# Patient Record
Sex: MALE | Race: OTHER | HISPANIC OR LATINO
[De-identification: names, ages, dates, MRNs, and addresses within clinical notes are randomized per-mention and may not be internally consistent; named-entity substitution may affect disease eponyms.]

---

## 2019-03-11 PROBLEM — Z00.00 ENCOUNTER FOR PREVENTIVE HEALTH EXAMINATION: Status: ACTIVE | Noted: 2019-03-11

## 2019-03-14 ENCOUNTER — APPOINTMENT (OUTPATIENT)
Dept: PULMONOLOGY | Facility: CLINIC | Age: 64
End: 2019-03-14

## 2019-03-15 ENCOUNTER — APPOINTMENT (OUTPATIENT)
Dept: PULMONOLOGY | Facility: CLINIC | Age: 64
End: 2019-03-15
Payer: MEDICARE

## 2019-03-15 VITALS
DIASTOLIC BLOOD PRESSURE: 74 MMHG | WEIGHT: 182 LBS | HEART RATE: 94 BPM | HEIGHT: 71 IN | SYSTOLIC BLOOD PRESSURE: 124 MMHG | OXYGEN SATURATION: 97 % | TEMPERATURE: 98.6 F | BODY MASS INDEX: 25.48 KG/M2

## 2019-03-15 DIAGNOSIS — I10 ESSENTIAL (PRIMARY) HYPERTENSION: ICD-10-CM

## 2019-03-15 DIAGNOSIS — Z01.818 ENCOUNTER FOR OTHER PREPROCEDURAL EXAMINATION: ICD-10-CM

## 2019-03-15 DIAGNOSIS — E11.9 TYPE 2 DIABETES MELLITUS W/OUT COMPLICATIONS: ICD-10-CM

## 2019-03-15 DIAGNOSIS — E78.5 HYPERLIPIDEMIA, UNSPECIFIED: ICD-10-CM

## 2019-03-15 DIAGNOSIS — M54.5 LOW BACK PAIN: ICD-10-CM

## 2019-03-15 DIAGNOSIS — Z78.9 OTHER SPECIFIED HEALTH STATUS: ICD-10-CM

## 2019-03-15 DIAGNOSIS — Z87.891 PERSONAL HISTORY OF NICOTINE DEPENDENCE: ICD-10-CM

## 2019-03-15 PROCEDURE — ZZZZZ: CPT

## 2019-03-15 PROCEDURE — 94726 PLETHYSMOGRAPHY LUNG VOLUMES: CPT

## 2019-03-15 PROCEDURE — 94618 PULMONARY STRESS TESTING: CPT

## 2019-03-15 PROCEDURE — 99204 OFFICE O/P NEW MOD 45 MIN: CPT | Mod: 25

## 2019-03-15 PROCEDURE — 94060 EVALUATION OF WHEEZING: CPT | Mod: 59

## 2019-03-15 PROCEDURE — 99407 BEHAV CHNG SMOKING > 10 MIN: CPT

## 2019-03-15 PROCEDURE — 94729 DIFFUSING CAPACITY: CPT

## 2019-03-15 RX ORDER — INSULIN GLARGINE 100 [IU]/ML
100 INJECTION, SOLUTION SUBCUTANEOUS
Refills: 0 | Status: ACTIVE | COMMUNITY

## 2019-03-15 RX ORDER — MIDODRINE HYDROCHLORIDE 10 MG/1
TABLET ORAL
Refills: 0 | Status: ACTIVE | COMMUNITY

## 2019-03-15 RX ORDER — TAMSULOSIN HYDROCHLORIDE 0.4 MG/1
0.4 CAPSULE ORAL
Refills: 0 | Status: ACTIVE | COMMUNITY

## 2019-03-15 RX ORDER — FLUDROCORTISONE ACETATE 0.1 MG/1
0.1 TABLET ORAL
Refills: 0 | Status: ACTIVE | COMMUNITY

## 2019-03-15 RX ORDER — LISINOPRIL 30 MG/1
TABLET ORAL
Refills: 0 | Status: ACTIVE | COMMUNITY

## 2019-03-15 RX ORDER — BUMETANIDE 2 MG/1
TABLET ORAL
Refills: 0 | Status: ACTIVE | COMMUNITY

## 2019-03-15 RX ORDER — NORTRIPTYLINE HYDROCHLORIDE 10 MG/1
10 CAPSULE ORAL
Refills: 0 | Status: ACTIVE | COMMUNITY

## 2019-03-15 RX ORDER — VARENICLINE TARTRATE 1 MG/1
TABLET, FILM COATED ORAL
Refills: 0 | Status: ACTIVE | COMMUNITY

## 2019-03-15 RX ORDER — DILTIAZEM HYDROCHLORIDE 300 MG/1
300 CAPSULE, EXTENDED RELEASE ORAL
Refills: 0 | Status: ACTIVE | COMMUNITY

## 2019-03-15 RX ORDER — DULOXETINE HCL 100 %
POWDER (GRAM) MISCELLANEOUS
Refills: 0 | Status: ACTIVE | COMMUNITY

## 2019-03-15 RX ORDER — ATORVASTATIN CALCIUM 80 MG/1
TABLET, FILM COATED ORAL
Refills: 0 | Status: ACTIVE | COMMUNITY

## 2019-03-15 RX ORDER — MELOXICAM 15 MG/1
15 TABLET ORAL
Refills: 0 | Status: ACTIVE | COMMUNITY

## 2019-03-15 RX ORDER — ZOLPIDEM TARTRATE 5 MG/1
TABLET, FILM COATED ORAL
Refills: 0 | Status: ACTIVE | COMMUNITY

## 2019-03-15 RX ORDER — PREGABALIN 50 MG/1
50 CAPSULE ORAL
Refills: 0 | Status: ACTIVE | COMMUNITY

## 2019-03-15 RX ORDER — FINASTERIDE 5 MG/1
5 TABLET, FILM COATED ORAL
Refills: 0 | Status: ACTIVE | COMMUNITY

## 2019-03-15 NOTE — ASSESSMENT
[FreeTextEntry1] : 62 yo with htn, hld, dm, dm neuropathy, and significant pain syndrome 2/2 neuropathy (???) s/p spinal stimulator referred for pre op risk stratification. Referred by Dr. Luciano Hyatt ([phone]). Denies all respiratory symptoms. Indicated for CXR and full PFTs/6MWT for risk stratification. PFTs showed mild restriction with moderate decrease in DLCO. Restriction is most likely secondary to kyphosis. No desaturation on 6MWT. The moderate decrease in DLCO may be secondary to suspected emphysema but a further workup is  needed. The pending CXR may show some pathology. This workup should not preclude proceeding with surgery as planned and can be done after he has healed post op especially since he is asymptomatic from the respiratory perspective. Of note, Mr. Ivory only endorses consistent snoring. He is on opiate medications which can lead to central sleep apnea but low clinical suspicion for obstructive sleep apnea. If there is concern on behalf of anesthesia for GRETA standard ASA GRETA precautions should be used and include judicious use of opiate medications, extubation when fully awake and fully upright, as well as prolonged monitoring. Smoking cessation counseling provided. Mr. Ivory will follow up for further workup of his lung disease after his surgery. \par \par  [No tobacco use since ___] : No tobacco use since [unfilled] [Smoking Cessation Counseling Given (more than 10 minutes) and Resources Provided] : Smoking cessation counseling given (more than 10 minutes) and resources provided [Ready] : Patient is ready for cessation intervention [Action] : Action: patient is a former smoker who stopped within the past 6 months [Following  treatment as advised] : Following  treatment as advised [Continue smoking cessation plan] : Continue smoking cessation plan [Maintain commitment] : Maintain commitment

## 2019-03-15 NOTE — CONSULT LETTER
[Dear  ___] : Dear  [unfilled], [Consult Letter:] : I had the pleasure of evaluating your patient, [unfilled]. [Please see my note below.] : Please see my note below. [Consult Closing:] : Thank you very much for allowing me to participate in the care of this patient.  If you have any questions, please do not hesitate to contact me. [Sincerely,] : Sincerely, [FreeTextEntry3] : Ewa Tai MD\par \par [employer] [facility]\par Pulmonary, Critical Care, and Sleep Medicine\par

## 2019-03-15 NOTE — HISTORY OF PRESENT ILLNESS
[Stable] : are stable [Difficulty Breathing During Exertion] : denies dyspnea on exertion [Feelings Of Weakness On Exertion] : denies exercise intolerance [Cough] : denies coughing [Wheezing] : denies wheezing [Regional Soft Tissue Swelling Both Lower Extremities] : denies lower extremity edema [Chest Pain Or Discomfort] : denies chest pain [Fever] : denies fever [0  -  Nothing at all] : 0, nothing at all [Class I - No Symptoms and No Limitations] : I [Snoring] : snoring [ESS: ___] : ESS score [unfilled] [Awakes Unrefreshed] : awakening unrefreshed [Oxygen] : the patient uses no supplemental oxygen [Witnessed Apneas] : no witnessed sleep apnea [Frequent Nocturnal Awakening] : no nocturnal awakening [Daytime Somnolence] : no daytime somnolence [Unintentional Sleep while Active] : no unintentional sleep while active [Unintentional Sleep While Inactive] : no unintentional sleep while inactive [Awakes with Headache] : no headache upon awakening [Awakening With Dry Mouth] : no dry mouth upon awakening [Recent  Weight Gain] : no recent weight gain [DIS] : no DIS [DMS] : no DMS [Unusual Sleep Behavior] : no unusual sleep behavior [Hypersomnolence] : no hypersomnolence [Cataplexy] : no cataplexy [Sleep Paralysis] : no sleep paralysis [Hypnagogic Hallucinations] : no hallucinations when falling asleep [Hypnopompic Hallucinations] : no hallucinations when awakening [Lower Extremity Discomfort] : no lower extremity discomfort in evening or at bedtime [FreeTextEntry1] : 64 yo with htn, hld, dm, dm neuropathy, and significant pain syndrome 2/2 neuropathy (???) s/p spinal stimulator referred for pre op risk stratification. He is pending stimulator replacement in early April by Dr. JEANNE Hyatt. Has a pain med pump with dilaudid and bupivacaine which has been in place for at least 2 years. Has had numerous spinal surgeries under general anesthesia with no complications. Follows at [facility] for all of his medical care but his PCP is on vacation for risk stratification. No personal or family history of any bleeding or clotting disorders.\par \par States that he quit smoking in Jan 2019 but his person still smells as if he is an active smoker. Denies all respiratory symptoms. Has never been hospitalized for any respiratory related issue. Had breathing tests a long time ago. No cough. No hemoptysis. No weight loss. Has never had a diagnosis of any lung related disease. \par \par Denies all sleep related symptoms except snoring. Does endorse intermittent daytime sleepiness and unrefreshed sleep.

## 2019-03-15 NOTE — PHYSICAL EXAM
[General Appearance - Well Developed] : well developed [Normal Appearance] : normal appearance [Well Groomed] : well groomed [General Appearance - Well Nourished] : well nourished [No Deformities] : no deformities [General Appearance - In No Acute Distress] : no acute distress [Normal Oropharynx] : normal oropharynx [Neck Appearance] : the appearance of the neck was normal [Heart Rate And Rhythm] : heart rate and rhythm were normal [Heart Sounds] : normal S1 and S2 [] : no respiratory distress [Respiration, Rhythm And Depth] : normal respiratory rhythm and effort [Exaggerated Use Of Accessory Muscles For Inspiration] : no accessory muscle use [Auscultation Breath Sounds / Voice Sounds] : lungs were clear to auscultation bilaterally [Kyphosis] : kyphosis [Bowel Sounds] : normal bowel sounds [Abdomen Soft] : soft [Abnormal Walk] : normal gait [Nail Clubbing] : no clubbing of the fingernails [Cyanosis, Localized] : no localized cyanosis [Other: ___] : [unfilled] [Right] : right [Skin Color & Pigmentation] : normal skin color and pigmentation [Skin Turgor] : normal skin turgor [No Focal Deficits] : no focal deficits [Oriented To Time, Place, And Person] : oriented to person, place, and time [Impaired Insight] : insight and judgment were intact [Affect] : the affect was normal [Mood] : the mood was normal [Redness] : no redness [Swelling] : no swelling [Tenderness] : no tenderness [Discharge] : no discharge [Excoriation] : no excoriation of surrounding skin

## 2019-06-16 ENCOUNTER — EMERGENCY (EMERGENCY)
Facility: HOSPITAL | Age: 64
LOS: 1 days | Discharge: HOME | End: 2019-06-16
Attending: EMERGENCY MEDICINE | Admitting: EMERGENCY MEDICINE
Payer: MEDICARE

## 2019-06-16 VITALS
HEART RATE: 100 BPM | OXYGEN SATURATION: 96 % | WEIGHT: 185.85 LBS | TEMPERATURE: 99 F | SYSTOLIC BLOOD PRESSURE: 161 MMHG | DIASTOLIC BLOOD PRESSURE: 96 MMHG | RESPIRATION RATE: 14 BRPM

## 2019-06-16 VITALS
TEMPERATURE: 98 F | DIASTOLIC BLOOD PRESSURE: 104 MMHG | SYSTOLIC BLOOD PRESSURE: 174 MMHG | HEART RATE: 81 BPM | OXYGEN SATURATION: 98 % | RESPIRATION RATE: 16 BRPM

## 2019-06-16 LAB
ALBUMIN SERPL ELPH-MCNC: 3.7 G/DL — SIGNIFICANT CHANGE UP (ref 3.3–5)
ALP SERPL-CCNC: 128 U/L — HIGH (ref 40–120)
ALT FLD-CCNC: 12 U/L — SIGNIFICANT CHANGE UP (ref 10–45)
ANION GAP SERPL CALC-SCNC: 9 MMOL/L — SIGNIFICANT CHANGE UP (ref 5–17)
AST SERPL-CCNC: 17 U/L — SIGNIFICANT CHANGE UP (ref 10–40)
BASOPHILS # BLD AUTO: 0.02 K/UL — SIGNIFICANT CHANGE UP (ref 0–0.2)
BASOPHILS NFR BLD AUTO: 0.5 % — SIGNIFICANT CHANGE UP (ref 0–2)
BILIRUB SERPL-MCNC: 0.3 MG/DL — SIGNIFICANT CHANGE UP (ref 0.2–1.2)
BUN SERPL-MCNC: 13 MG/DL — SIGNIFICANT CHANGE UP (ref 7–23)
CALCIUM SERPL-MCNC: 8.5 MG/DL — SIGNIFICANT CHANGE UP (ref 8.4–10.5)
CHLORIDE SERPL-SCNC: 102 MMOL/L — SIGNIFICANT CHANGE UP (ref 96–108)
CO2 SERPL-SCNC: 30 MMOL/L — SIGNIFICANT CHANGE UP (ref 22–31)
CREAT SERPL-MCNC: 0.84 MG/DL — SIGNIFICANT CHANGE UP (ref 0.5–1.3)
CRP SERPL-MCNC: 0.9 MG/DL — HIGH (ref 0–0.4)
EOSINOPHIL # BLD AUTO: 0.3 K/UL — SIGNIFICANT CHANGE UP (ref 0–0.5)
EOSINOPHIL NFR BLD AUTO: 7.1 % — HIGH (ref 0–6)
ERYTHROCYTE [SEDIMENTATION RATE] IN BLOOD: 49 MM/HR — HIGH
GLUCOSE SERPL-MCNC: 125 MG/DL — HIGH (ref 70–99)
HCT VFR BLD CALC: 34.8 % — LOW (ref 39–50)
HGB BLD-MCNC: 11.1 G/DL — LOW (ref 13–17)
IMM GRANULOCYTES NFR BLD AUTO: 0.2 % — SIGNIFICANT CHANGE UP (ref 0–1.5)
LYMPHOCYTES # BLD AUTO: 0.93 K/UL — LOW (ref 1–3.3)
LYMPHOCYTES # BLD AUTO: 22.1 % — SIGNIFICANT CHANGE UP (ref 13–44)
MCHC RBC-ENTMCNC: 28.5 PG — SIGNIFICANT CHANGE UP (ref 27–34)
MCHC RBC-ENTMCNC: 31.9 GM/DL — LOW (ref 32–36)
MCV RBC AUTO: 89.2 FL — SIGNIFICANT CHANGE UP (ref 80–100)
MONOCYTES # BLD AUTO: 0.25 K/UL — SIGNIFICANT CHANGE UP (ref 0–0.9)
MONOCYTES NFR BLD AUTO: 6 % — SIGNIFICANT CHANGE UP (ref 2–14)
NEUTROPHILS # BLD AUTO: 2.69 K/UL — SIGNIFICANT CHANGE UP (ref 1.8–7.4)
NEUTROPHILS NFR BLD AUTO: 64.1 % — SIGNIFICANT CHANGE UP (ref 43–77)
NRBC # BLD: 0 /100 WBCS — SIGNIFICANT CHANGE UP (ref 0–0)
PLATELET # BLD AUTO: 196 K/UL — SIGNIFICANT CHANGE UP (ref 150–400)
POTASSIUM SERPL-MCNC: 3.8 MMOL/L — SIGNIFICANT CHANGE UP (ref 3.5–5.3)
POTASSIUM SERPL-SCNC: 3.8 MMOL/L — SIGNIFICANT CHANGE UP (ref 3.5–5.3)
PROT SERPL-MCNC: 7.6 G/DL — SIGNIFICANT CHANGE UP (ref 6–8.3)
RBC # BLD: 3.9 M/UL — LOW (ref 4.2–5.8)
RBC # FLD: 13.1 % — SIGNIFICANT CHANGE UP (ref 10.3–14.5)
SODIUM SERPL-SCNC: 141 MMOL/L — SIGNIFICANT CHANGE UP (ref 135–145)
WBC # BLD: 4.2 K/UL — SIGNIFICANT CHANGE UP (ref 3.8–10.5)
WBC # FLD AUTO: 4.2 K/UL — SIGNIFICANT CHANGE UP (ref 3.8–10.5)

## 2019-06-16 PROCEDURE — 85652 RBC SED RATE AUTOMATED: CPT

## 2019-06-16 PROCEDURE — 36415 COLL VENOUS BLD VENIPUNCTURE: CPT

## 2019-06-16 PROCEDURE — 93971 EXTREMITY STUDY: CPT | Mod: 26,LT

## 2019-06-16 PROCEDURE — 93971 EXTREMITY STUDY: CPT

## 2019-06-16 PROCEDURE — 82962 GLUCOSE BLOOD TEST: CPT

## 2019-06-16 PROCEDURE — 73630 X-RAY EXAM OF FOOT: CPT | Mod: 26

## 2019-06-16 PROCEDURE — 73630 X-RAY EXAM OF FOOT: CPT | Mod: 26,LT

## 2019-06-16 PROCEDURE — 86140 C-REACTIVE PROTEIN: CPT

## 2019-06-16 PROCEDURE — 99284 EMERGENCY DEPT VISIT MOD MDM: CPT | Mod: 25

## 2019-06-16 PROCEDURE — 85025 COMPLETE CBC W/AUTO DIFF WBC: CPT

## 2019-06-16 PROCEDURE — 73630 X-RAY EXAM OF FOOT: CPT

## 2019-06-16 PROCEDURE — 80053 COMPREHEN METABOLIC PANEL: CPT

## 2019-06-16 RX ORDER — MIDODRINE HYDROCHLORIDE 2.5 MG/1
0 TABLET ORAL
Qty: 0 | Refills: 0 | DISCHARGE

## 2019-06-16 RX ORDER — FLUDROCORTISONE ACETATE 0.1 MG/1
0 TABLET ORAL
Qty: 0 | Refills: 0 | DISCHARGE

## 2019-06-16 RX ORDER — MELOXICAM 15 MG/1
0 TABLET ORAL
Qty: 0 | Refills: 0 | DISCHARGE

## 2019-06-16 RX ORDER — FINASTERIDE 5 MG/1
0 TABLET, FILM COATED ORAL
Qty: 0 | Refills: 0 | DISCHARGE

## 2019-06-16 RX ORDER — BUMETANIDE 0.25 MG/ML
0 INJECTION INTRAMUSCULAR; INTRAVENOUS
Qty: 0 | Refills: 0 | DISCHARGE

## 2019-06-16 RX ORDER — IBUPROFEN 200 MG
600 TABLET ORAL ONCE
Refills: 0 | Status: COMPLETED | OUTPATIENT
Start: 2019-06-16 | End: 2019-06-16

## 2019-06-16 RX ORDER — NORTRIPTYLINE HYDROCHLORIDE 10 MG/1
0 CAPSULE ORAL
Qty: 0 | Refills: 0 | DISCHARGE

## 2019-06-16 RX ORDER — ZOLPIDEM TARTRATE 10 MG/1
0 TABLET ORAL
Qty: 0 | Refills: 0 | DISCHARGE

## 2019-06-16 RX ORDER — DULOXETINE HYDROCHLORIDE 30 MG/1
0 CAPSULE, DELAYED RELEASE ORAL
Qty: 0 | Refills: 0 | DISCHARGE

## 2019-06-16 RX ORDER — ATORVASTATIN CALCIUM 80 MG/1
0 TABLET, FILM COATED ORAL
Qty: 0 | Refills: 0 | DISCHARGE

## 2019-06-16 RX ADMIN — Medication 600 MILLIGRAM(S): at 18:55

## 2019-06-16 RX ADMIN — Medication 1 TABLET(S): at 20:10

## 2019-06-16 NOTE — CONSULT NOTE ADULT - SUBJECTIVE AND OBJECTIVE BOX
Attending: Caryl     Patient is a 63y old  Male who presents with a chief complaint of painful left 3rd digit ulcer     HPI: 64 yo M PMHx DMII c/b diabetic neuropathy, HTN, HLD presenting with chief complaint of left third digit ulcer with pain for about 1 month. Pt reports he had previously followed up with Dr. Jennifer Mayes DPERUM who surgically intervened for an ulcer of the same digit about 8-9 months ago. She had "shaved down" the toe. He now lives in [place], [place] visiting his son but also sees his doctors here in [place]. Patient admits to increased drainage, nausea and chills for the past few days.     Review of systems negative except per HPI    PAST MEDICAL & SURGICAL HISTORY:  CHF (congestive heart failure)  Hypercholesteremia  Diabetes  Neuropathic pain    Home Medications:  atorvastatin:  (16 Jun 2019 16:27)  bumetanide:  (16 Jun 2019 16:27)  DULoxetine:  (16 Jun 2019 16:27)  finasteride:  (16 Jun 2019 16:27)  fludrocortisone:  (16 Jun 2019 16:27)  Lyrica:  (16 Jun 2019 16:27)  meloxicam:  (16 Jun 2019 16:27)  midodrine:  (16 Jun 2019 16:27)  nortriptyline:  (16 Jun 2019 16:27)  zolpidem:  (16 Jun 2019 16:27)    Allergies  No Known Drug Allergies  Seafood (Other)    Social History: Former smoker. Nondrinker. Denies illicit drug use    LABS                        11.1   4.20  )-----------( 196      ( 16 Jun 2019 16:05 )             34.8     06-16    141  |  102  |  13  ----------------------------<  125<H>  3.8   |  30  |  0.84    Ca    8.5      16 Jun 2019 16:05    TPro  7.6  /  Alb  3.7  /  TBili  0.3  /  DBili  x   /  AST  17  /  ALT  12  /  AlkPhos  128<H>  06-16      ESR: 49  CRP: --  06-16 @ 16:05    Vital Signs Last 24 Hrs  T(C): 37 (16 Jun 2019 15:21), Max: 37 (16 Jun 2019 15:21)  T(F): 98.6 (16 Jun 2019 15:21), Max: 98.6 (16 Jun 2019 15:21)  HR: 100 (16 Jun 2019 15:21) (100 - 100)  BP: 161/96 (16 Jun 2019 15:21) (161/96 - 161/96)  BP(mean): --  RR: 14 (16 Jun 2019 15:21) (14 - 14)  SpO2: 96% (16 Jun 2019 15:21) (96% - 96%)    PHYSICAL EXAM  General: NAD, AA0x3    Lower Extremity Focused:  Vasc: palpable DP/PT pulses. TG wnl b/l. Mildly increased edema LLE>RLE. CFT brisk x 10.  Derm: Nevarez grade 1 ulcer at distal tip of left 3rd digit measuring 0.3cm x 0.3cm. No drainage, purulence, periwound erythema, probing, tracking or undermining. Granular base.   Neuro: Grossly diminished b/l  MSK:  TTP left 3rd digit     RADIOLOGY  Wet read: No acute pathology. Previous surgical changes noted to left 3rd digit. no soft tissue emphysema. no evidence of fracture or dislocation

## 2019-06-16 NOTE — ED PROVIDER NOTE - PHYSICAL EXAMINATION
CONSTITUTIONAL: Well-appearing; well-nourished; in no apparent distress.   HEAD: Normocephalic; atraumatic.   EYES: PERRL; EOM intact; conjunctiva and sclera clear  ENT: normal nose; no rhinorrhea; normal pharynx with no erythema or lesions.   NECK: Supple; non-tender; no LAD  CARDIOVASCULAR: Normal S1, S2; no murmurs, rubs, or gallops. Regular rate and rhythm.   RESPIRATORY: Breathing easily; breath sounds clear and equal bilaterally; no wheezes, rhonchi, or rales.  GI: Soft; non-distended; non-tender; no palpable organomegaly.   MSK: FROM at all extremities, normal tone   EXT: +hyperpigmentation and swelling of b/l LE. LLE more swollen than L with mild warmth and tenderness to calf. Compartments soft. L 3rd toe +swelling, +small1 cm superficial ulceration to tip of toe, no drainage, warmth or foul smell. DP pulse weak 1+.   SKIN: Normal for age and race; warm; dry; good turgor; no apparent lesions or rash.   NEURO: A & O x 3; face symmetric; grossly unremarkable.   PSYCHOLOGICAL: The patient’s mood and manner are appropriate.

## 2019-06-16 NOTE — ED PROVIDER NOTE - OBJECTIVE STATEMENT
62 yo m with pmh of DM and diabetic neuropathy c/o wound to L 3rd toe x 1 month. Pt states he used to see a podiatrist but has not been in a long time. Pt states he had a problem with the same toe before and they had to "shave it down." Denies fever, chills. Admits to swelling of toe and  now left leg for the past few days. Denies cp, sob, recent travel. Reports chronic neuropathy in his feet. Also c/o intermittent nausea for a few weeks but no nausea now. Denies vomiting, abd pain.

## 2019-06-16 NOTE — ED PROVIDER NOTE - CLINICAL SUMMARY MEDICAL DECISION MAKING FREE TEXT BOX
64 yo m with pmh of DM and diabetic neuropathy c/o wound to L 3rd toe x 1 month. Also with L l eg swelling for a few days. No f/c, cp, sob. VSS. On exam +hyperpigmentation and swelling of b/l LE. LLE more swollen than L with mild warmth and tenderness to calf. Compartments soft. L 3rd toe +swelling, +small1 cm superficial ulceration to tip of toe, no drainage, warmth or foul smell. DP pulse weak 1+. 62 yo m with pmh of DM and diabetic neuropathy c/o wound to L 3rd toe x 1 month. Also with L l eg swelling for a few days. No f/c, cp, sob. VSS. On exam +hyperpigmentation and swelling of b/l LE. LLE more swollen than L with mild warmth and tenderness to calf. Compartments soft. L 3rd toe +swelling, +small 1 cm superficial ulceration to tip of toe, no drainage, warmth or foul smell. DP pulse weak 1+. Xray Previous surgical changes noted to left 3rd digit. no soft tissue emphysema. no evidence of fracture or dislocation. Seen by podiatry, no indication for IV abx. Advised augmentin and podiatry f/u. Doppler neg for DVT.

## 2019-06-16 NOTE — CONSULT NOTE ADULT - ASSESSMENT
62 yo M PMHx DMII c/b diabetic neuropathy, HTN, HLD presenting with painful left third digit Nevarez grade 1 ulcer    - No acute signs of infection appreciated at ulcer site   - 62 yo M PMHx DMII c/b diabetic neuropathy, HTN, HLD presenting with painful left third digit Nevarez grade 1 ulcer    - No acute signs of infection appreciated at ulcer site   - Applied dry sterile dressing. Recommend Bacitracin to be applied daily with dry sterile dressing  - if patient cannot follow up with Dr. Jennifer Mayes, then recommend making appointment at either [facility]: [address] [place] [address] ([phone])  or [facility] at [address] [place] [address] [phone]  - Recommend discharging patient on 875 BID Augmentin for 7-10 days  - Patient is stable from podiatry standpoint  - Informed importance of proper follow-up for diabetic foot wound care. Pt expressed verbal understanding

## 2019-06-16 NOTE — ED PROVIDER NOTE - ATTENDING CONTRIBUTION TO CARE
Pt w/ PMHx DM, neuropathy p/w ulcer L 3rt toe x 1 month. Pt has been previously recommended amputation fo the toe, but instead "shaved it down." No f/c. + baseline paraesthesia, unchanged. + LLE for few days. + pain associated. No trauma. Pt also reports int nausea for a few weeks, none today. No abd pain. Pt has not seen podiatry in months. Pt w/ PMHx DM, neuropathy p/w ulcer L 3rt toe x 1 month. Pt has been previously recommended amputation fo the toe, but instead "shaved it down." No f/c. + baseline paraesthesia, unchanged. + LLE for few days. + pain associated. No trauma. Pt also reports int nausea for a few weeks, none today. No abd pain. Pt has not seen podiatry in months.  Constitutional: Well appearing, well nourished, awake, alert, oriented to person, place, time/situation and in no apparent distress.  ENMT: Airway patent. Normal MM  rding, rebound, or rigidity. No pulsatile abdominal masses. No organomegaly appreciated. No CVAT   Musculoskeletal: Range of motion is not limited. +hyperpigmentation and swelling of b/l LE. LLE more swollen than L with mild warmth and tenderness to calf. Compartments soft. L 3rd toe +swelling, +small 1-2 cm superficial ulceration to tip of toe, no drainage, warmth or foul smell. 2+ pulses b/l  Neuro: Alert and oriented x 3, face symmetric and speech fluent. Strength 5/5 x 4 ext and symmetric, nml gross motor movement, nml gait. No focal deficits noted.  Skin: Skin normal color for race, warm, dry and intact. No evidence of rash.  Psych: Alert and oriented to person, place, time/situation. normal mood and affect. no apparent risk to self or others.   Diabetic ulcer. Prior "shaving down' of digits. No clear bony involvement on XR. PT seen and examined by podiatry. Recommended trial of outpt abx at this time and close podiatry f/u. Pt instructed on necessity of close f/u and given return precautions

## 2019-06-16 NOTE — ED PROVIDER NOTE - CARE PROVIDER_API CALL
Jennifer Mayes (DPM)  Surgery  [address] [place] [address], [address]  [place] [address]  Phone: [phone]  Fax: [phone]  Follow Up Time:

## 2019-06-16 NOTE — ED PROVIDER NOTE - DIAGNOSTIC INTERPRETATION
ER PA: Natasha Hair  foot xray INTERPRETATION:  Previous surgical changes noted to left 3rd digit. no soft tissue emphysema. no evidence of fracture or dislocation

## 2019-06-16 NOTE — ED ADULT NURSE NOTE - NSIMPLEMENTINTERV_GEN_ALL_ED
Implemented All Universal Safety Interventions:  [place] to call system. Call bell, personal items and telephone within reach. Instruct patient to call for assistance. Room bathroom lighting operational. Non-slip footwear when patient is off stretcher. Physically safe environment: no spills, clutter or unnecessary equipment. Stretcher in lowest position, wheels locked, appropriate side rails in place.

## 2019-06-20 DIAGNOSIS — Z48.00 ENCOUNTER FOR CHANGE OR REMOVAL OF NONSURGICAL WOUND DRESSING: ICD-10-CM

## 2019-06-20 DIAGNOSIS — L97.529 NON-PRESSURE CHRONIC ULCER OF OTHER PART OF LEFT FOOT WITH UNSPECIFIED SEVERITY: ICD-10-CM
